# Patient Record
Sex: MALE | Race: OTHER
[De-identification: names, ages, dates, MRNs, and addresses within clinical notes are randomized per-mention and may not be internally consistent; named-entity substitution may affect disease eponyms.]

---

## 2017-08-25 ENCOUNTER — OTHER (OUTPATIENT)
Age: 47
End: 2017-08-25

## 2017-12-01 ENCOUNTER — EMERGENCY (EMERGENCY)
Facility: HOSPITAL | Age: 47
LOS: 1 days | Discharge: HOME | End: 2017-12-01
Attending: EMERGENCY MEDICINE | Admitting: EMERGENCY MEDICINE
Payer: MEDICARE

## 2017-12-01 VITALS
OXYGEN SATURATION: 100 % | TEMPERATURE: 99 F | DIASTOLIC BLOOD PRESSURE: 85 MMHG | HEART RATE: 85 BPM | RESPIRATION RATE: 16 BRPM | SYSTOLIC BLOOD PRESSURE: 145 MMHG

## 2017-12-01 DIAGNOSIS — R41.82 ALTERED MENTAL STATUS, UNSPECIFIED: ICD-10-CM

## 2017-12-01 DIAGNOSIS — E11.9 TYPE 2 DIABETES MELLITUS WITHOUT COMPLICATIONS: ICD-10-CM

## 2017-12-01 DIAGNOSIS — F10.129 ALCOHOL ABUSE WITH INTOXICATION, UNSPECIFIED: ICD-10-CM

## 2017-12-01 DIAGNOSIS — E78.00 PURE HYPERCHOLESTEROLEMIA, UNSPECIFIED: ICD-10-CM

## 2017-12-01 DIAGNOSIS — F17.200 NICOTINE DEPENDENCE, UNSPECIFIED, UNCOMPLICATED: ICD-10-CM

## 2017-12-01 PROCEDURE — 99284 EMERGENCY DEPT VISIT MOD MDM: CPT

## 2017-12-01 NOTE — ED PROVIDER NOTE - OBJECTIVE STATEMENT
48 y/o M [facility] for ETOH intoxication, admits to ETOH today, no other complaints, unable to cooperate with remainder of history. Patient also with previous injury to left eye noted, and patient states it is old.

## 2017-12-01 NOTE — ED PROVIDER NOTE - PMH
Arthropathy  Arthritis  Depressive disorder  Depression  Gastroesophageal reflux disease  GERD (gastroesophageal reflux disease)  Hypercholesterolemia  High cholesterol  Low back pain  Low back pain  Morbid obesity  Morbid obesity  Morbid obesity  Morbid obesity  Type 2 diabetes mellitus  IDDM (insulin dependent diabetes mellitus)  Type 2 diabetes mellitus with neurological manifestations  Diabetic neuropathy  Type 2 diabetes mellitus with ophthalmic manifestations  Diabetic retinopathy

## 2017-12-01 NOTE — ED PROVIDER NOTE - PSH
Other postprocedural status  15 years ago  Other postprocedural status  as a child  Status post bariatric surgery  S/P laparoscopic sleeve gastrectomy